# Patient Record
Sex: FEMALE | Race: WHITE | NOT HISPANIC OR LATINO | URBAN - METROPOLITAN AREA
[De-identification: names, ages, dates, MRNs, and addresses within clinical notes are randomized per-mention and may not be internally consistent; named-entity substitution may affect disease eponyms.]

---

## 2023-07-26 ENCOUNTER — APPOINTMENT (RX ONLY)
Dept: URBAN - METROPOLITAN AREA CLINIC 45 | Facility: CLINIC | Age: 48
Setting detail: DERMATOLOGY
End: 2023-07-26

## 2023-07-26 ENCOUNTER — APPOINTMENT (RX ONLY)
Dept: URBAN - METROPOLITAN AREA CLINIC 44 | Facility: CLINIC | Age: 48
Setting detail: DERMATOLOGY
End: 2023-07-26

## 2023-07-26 DIAGNOSIS — Z41.9 ENCOUNTER FOR PROCEDURE FOR PURPOSES OTHER THAN REMEDYING HEALTH STATE, UNSPECIFIED: ICD-10-CM

## 2023-07-26 DIAGNOSIS — L81.4 OTHER MELANIN HYPERPIGMENTATION: ICD-10-CM

## 2023-07-26 DIAGNOSIS — L82.1 OTHER SEBORRHEIC KERATOSIS: ICD-10-CM

## 2023-07-26 DIAGNOSIS — D18.0 HEMANGIOMA: ICD-10-CM

## 2023-07-26 DIAGNOSIS — L73.8 OTHER SPECIFIED FOLLICULAR DISORDERS: ICD-10-CM

## 2023-07-26 DIAGNOSIS — L90.8 OTHER ATROPHIC DISORDERS OF SKIN: ICD-10-CM

## 2023-07-26 DIAGNOSIS — D22 MELANOCYTIC NEVI: ICD-10-CM

## 2023-07-26 PROBLEM — D18.01 HEMANGIOMA OF SKIN AND SUBCUTANEOUS TISSUE: Status: ACTIVE | Noted: 2023-07-26

## 2023-07-26 PROBLEM — D22.5 MELANOCYTIC NEVI OF TRUNK: Status: ACTIVE | Noted: 2023-07-26

## 2023-07-26 PROCEDURE — ? TREATMENT REGIMEN

## 2023-07-26 PROCEDURE — 99203 OFFICE O/P NEW LOW 30 MIN: CPT

## 2023-07-26 PROCEDURE — ? ADDITIONAL NOTES

## 2023-07-26 PROCEDURE — ? PRODUCT LINE (REVISION)

## 2023-07-26 PROCEDURE — ? COUNSELING

## 2023-07-26 PROCEDURE — ? PRESCRIPTION

## 2023-07-26 PROCEDURE — ? COSMETIC QUOTE

## 2023-07-26 PROCEDURE — ? FULL BODY SKIN EXAM

## 2023-07-26 RX ORDER — TRETIONIN 1 MG/G
CREAM TOPICAL
Qty: 45 | Refills: 5 | Status: ERX | COMMUNITY
Start: 2023-07-26

## 2023-07-26 RX ADMIN — TRETIONIN: 1 CREAM TOPICAL at 00:00

## 2023-07-26 ASSESSMENT — LOCATION SIMPLE DESCRIPTION DERM
LOCATION SIMPLE: LEFT UPPER BACK
LOCATION SIMPLE: CHEST
LOCATION SIMPLE: RIGHT CHEEK
LOCATION SIMPLE: RIGHT CHEEK
LOCATION SIMPLE: LEFT CHEEK
LOCATION SIMPLE: LEFT CHEEK
LOCATION SIMPLE: ABDOMEN
LOCATION SIMPLE: CHEST

## 2023-07-26 ASSESSMENT — LOCATION ZONE DERM
LOCATION ZONE: TRUNK
LOCATION ZONE: FACE
LOCATION ZONE: FACE
LOCATION ZONE: TRUNK

## 2023-07-26 ASSESSMENT — LOCATION DETAILED DESCRIPTION DERM
LOCATION DETAILED: RIGHT CENTRAL MALAR CHEEK
LOCATION DETAILED: LEFT MID-UPPER BACK
LOCATION DETAILED: EPIGASTRIC SKIN
LOCATION DETAILED: RIGHT INFERIOR CENTRAL MALAR CHEEK
LOCATION DETAILED: RIGHT LATERAL SUPERIOR CHEST
LOCATION DETAILED: LEFT INFERIOR MEDIAL MALAR CHEEK
LOCATION DETAILED: RIGHT LATERAL ABDOMEN
LOCATION DETAILED: LEFT INFERIOR CENTRAL MALAR CHEEK
LOCATION DETAILED: LEFT LATERAL SUPERIOR CHEST

## 2023-07-26 NOTE — HPI: EVALUATION OF SKIN LESION(S)
What Type Of Note Output Would You Prefer (Optional)?: Bullet Format
Hpi Title: Evaluation of Skin Lesions
How Severe Are Your Spot(S)?: mild
Have Your Spot(S) Been Treated In The Past?: has not been treated
Additional History: New pt\\n\\nUsed the tretinoin 0.5% but wants to go up  (anti aging )

## 2023-07-26 NOTE — PROCEDURE: PRODUCT LINE (REVISION)
Allow Plan To Count Towards E/M Coding: Yes
Product 14 Units: 0
Product 32 Price (In Dollars - Numeric Only, No Special Characters Or $): 107.00
Product 35 Price (In Dollars - Numeric Only, No Special Characters Or $): 133.00
Name Of Product 21: Revox 7
Name Of Product 41: Proscriptix Advanced Corrective Cream
Product 29 Application Directions: Apply at night for a softening/moisturizing treatment on lips
Name Of Product 38: Obagi Makeup Wipes
Product 2 Price (In Dollars - Numeric Only, No Special Characters Or $): 84.00
Product 17 Price (In Dollars - Numeric Only, No Special Characters Or $): 160.00
Product 5 Price (In Dollars - Numeric Only, No Special Characters Or $): 62.00
Name Of Product 50: UpNeeq
Name Of Product 47: Antioxident Infused gentle Cleanser
Name Of Product 12: ProPil Vitamins
Product 14 Application Directions: Eyelash serum, apply first tube daily and apply second tube every 3 days to maintain
Product 38 Price (In Dollars - Numeric Only, No Special Characters Or $): 25.00
Name Of Product 44: HQ 2%
Name Of Product 24: Tretinoin
Product 8 Price (In Dollars - Numeric Only, No Special Characters Or $): 98.00
Product 24 Price (In Dollars - Numeric Only, No Special Characters Or $): 60.00
Product 44 Price (In Dollars - Numeric Only, No Special Characters Or $): 18.00
Product 2 Application Directions: Apply last in regimen daily in the morning
Product 17 Application Directions: Apply daily in the morning after serums before sunscreen
Name Of Product 30: DEJ Face Night cream
Name Of Product 27: Intellishade Matte
Product 41 Price (In Dollars - Numeric Only, No Special Characters Or $): 110.00
Product 21 Price (In Dollars - Numeric Only, No Special Characters Or $): 130.00
Product 8 Application Directions: Use daily in the morning for Hydration on face, neck, chest and hands.
Product 27 Price (In Dollars - Numeric Only, No Special Characters Or $): 80.00
Product 30 Price (In Dollars - Numeric Only, No Special Characters Or $): 178.00
Name Of Product 15: Brightening facial Wash
Product 47 Price (In Dollars - Numeric Only, No Special Characters Or $): 46.00
Product 32 Application Directions: Apply every morning before lotions.
Product 5 Application Directions: Apply every morning after Vitamin C, before moisturizer or sunscreen
Product 35 Application Directions: apply under eye every night
Product 50 Price (In Dollars - Numeric Only, No Special Characters Or $): $8
Name Of Product 6: Revox line relaxer
Product 15 Price (In Dollars - Numeric Only, No Special Characters Or $): 38.00
Name Of Product 33: Crispin
Product 21 Application Directions: Apply daily in the morning, especially on picture days
Name Of Product 36: Finishing Touch
Name Of Product 3: Obagi Vitamin C with 4% hydro-q
Name Of Product 18: Lumiquin
Product 18 Price (In Dollars - Numeric Only, No Special Characters Or $): 64.00
Product 6 Price (In Dollars - Numeric Only, No Special Characters Or $): 154.00
Name Of Product 39: Hair Bundle including Shampoo/conditioner/vitamins/serum
Product 33 Price (In Dollars - Numeric Only, No Special Characters Or $): 158.00
Product 3 Price (In Dollars - Numeric Only, No Special Characters Or $): 139.00
Product 24 Application Directions: Apply a small amount to face nighly, can start with 3 night per week and work up to every night. Avoid eyes.
Product 12 Application Directions: Take suppliment daily
Name Of Product 45: Color Science Sunforgettable Brush
Product 27 Application Directions: Apply every morning as the last step, before makeup. This is your sunscreen
Name Of Product 42: Obagi Acne Cleansing Wipes
Product 36 Price (In Dollars - Numeric Only, No Special Characters Or $): 50.00
Name Of Product 22: Firming Night Treatment
Name Of Product 28: Gentle Foaming Cleanser
Product 6 Units: 1
Product 22 Price (In Dollars - Numeric Only, No Special Characters Or $): 78.00
Name Of Product 13: Serum
Name Of Product 48: Obaig Exfoliating Cleanser
Product 15 Application Directions: Cleanse face every morning and night. If it starts to dry the skin switch to every other day use.
Name Of Product 25: Intellishade Clear
Name Of Product 31: REssentials Blemish rescue Serum
Name Of Product 16: DEJ Eye Cream
Product 18 Application Directions: Apply daily to hands with a pea size for both, can apply twice per day if desired
Product 3 Application Directions: Use daily in the morning (swap with Revision Vitamin C once desired results are achieved)
Product 10 Price (In Dollars - Numeric Only, No Special Characters Or $): 103.00
Product 33 Application Directions: Apply once per week, rinse thoroughly.
Product 48 Price (In Dollars - Numeric Only, No Special Characters Or $): 34.00
Product 6 Application Directions: Apply every night after cleansing, including eye lids.
Name Of Product 19: Nectifirm
Product 13 Price (In Dollars - Numeric Only, No Special Characters Or $): 90.00
Name Of Product 4: Mehran
Name Of Product 34: Pore Celso Mask
Name Of Product 1: C+ Correcting complex 30% vitamin C
Product 31 Price (In Dollars - Numeric Only, No Special Characters Or $): 51.00
Product 28 Price (In Dollars - Numeric Only, No Special Characters Or $): 44.00
Product 9 Application Directions: Use daily at night after cleansing (Vitamin A)
Product 36 Application Directions: Apply to dry skin once per week, add a small amount of water in a circular motion across entire face to exfoliate
Product 45 Application Directions: Medium
Product 10 Application Directions: Use nightly unitl gone as a spot treatment
Name Of Product 37: Super Charged C Serum
Name Of Product 10: Obagi  4% Hydro-Q
Detail Level: Zone
Product 16 Price (In Dollars - Numeric Only, No Special Characters Or $): 118.00
Product 1 Price (In Dollars - Numeric Only, No Special Characters Or $): 176.00
Product 22 Application Directions: Apply every night
Name Of Product 7: Papaya Enz Cleanser
Name Of Product 43: Brightening Pads
Name Of Product 20: Nectifirm Advanced
Product 7 Price (In Dollars - Numeric Only, No Special Characters Or $): 40.00
Name Of Product 23: DEJ Boosting Serum
Product 28 Application Directions: Use morning and night to cleanse face
Product 25 Application Directions: Apply a pea size of product to your face every morning as your last step before makeup.
Product 4 Price (In Dollars - Numeric Only, No Special Characters Or $): 106.00
Name Of Product 40: Intellishade Original
Product 31 Application Directions: Apply all over or as a spot treatment after cleansing. Use once daily or as needed for acne or folliculitis
Name Of Product 26: REssentials Brightening Pads
Product 16 Application Directions: Apply morning and night all over eyes
Name Of Product 29: Youthful Lip Complex
Name Of Product 11: Voumizing / Hydrating Shampoo and Conditioner
Name Of Product 49: Obagi Elastiderm Eye
Name Of Product 46: Retinol Complete .05
Name Of Product 14: Nu-Cil
Product 34 Application Directions: Apply a dime size to entire face 3 to 4 times per week
Product 11 Price (In Dollars - Numeric Only, No Special Characters Or $): 56.00
Product 26 Price (In Dollars - Numeric Only, No Special Characters Or $): 87.00
Name Of Product 32: HA Elixer
Product 4 Application Directions: Apply daily in the morning under eyes for puffiness and dark circles
Product 23 Price (In Dollars - Numeric Only, No Special Characters Or $): 225.00
Product 1 Application Directions: Apply daily in the morning
Product 37 Application Directions: Apply every morning before lotions
Name Of Product 2: Jim Saucedauphysical
Name Of Product 17: DEJ Face Cream
Assigning Risk Information: Per AMA, level of risk is based upon consequences of the problem(s) addressed at the encounter when appropriately treated. Risk also includes medical decision making related to the need to initiate or forego further testing, treatment and/or hospitalization. Over the counter medication are assigned a risk level of low. Prescription medication management is assigned a risk level of moderate.
Product 49 Price (In Dollars - Numeric Only, No Special Characters Or $): 115.00
Product 7 Application Directions: Wash face morning and night
Name Of Product 8: Obagi Hydro Drops
Product 43 Application Directions: No HQ
Product 23 Application Directions: Apply daily morning and night, or morning only if using DEJ night face cream
Product 40 Application Directions: This is your anti-aging, tinted moisturizer for day-time use. Contains SPF of 45
Product 20 Application Directions: Apply all over neck morning and/or night\\nUnder eye fat pads
Name Of Product 35: Obagi Retivance
Name Of Product 5: Vitamin k
Risk Of Complication Category: No MDM
Product 14 Price (In Dollars - Numeric Only, No Special Characters Or $): 120.00

## 2023-07-26 NOTE — PROCEDURE: ADDITIONAL NOTES
Additional Notes: Discuss Quote cosmetic $550 / treatment- v beam @ [facility] Additional Notes: Discuss Quote cosmetic $550 / treatment- v beam @ [facility]

## 2023-07-26 NOTE — PROCEDURE: COSMETIC QUOTE
Injectable 16 Free Text Discount (In Dollars- Use Only Numbers And Decimals): 0.00
Injectable 6 Units: 0
Injectable 3: Botox (forehead)
Body Procedure 9: Scalp PRP
Body Procedure 2 Price/Unit (In Dollars- Use Only Numbers And Decimals): 800.00
Face Procedure 1 Price/Unit (In Dollars- Use Only Numbers And Decimals): 429.00
Laser 7: Laser Hair Removal Beard Outline
Face Procedure 8: Hydrafacial Deluxe
Injectable 10 Price/Unit (In Dollars- Use Only Numbers And Decimals): 700.00
Laser 20: Laser Hair Removal Buttocks
Body Procedure 6: CoolPeel
Laser 14 Price/Unit (In Dollars- Use Only Numbers And Decimals): 299.00
Laser 4: Laser Hair Removal 1/2 Leg
Face Procedure 5: Viva Add on Neck
Body Procedure 3: Viva abdominal Scar
Laser 17: Laser Hair Removal Abdomen
Laser 11 Price/Unit (In Dollars- Use Only Numbers And Decimals): 99.00
Body Procedure 10 Price/Unit (In Dollars- Use Only Numbers And Decimals): 379.00
Breast Procedure 2 Price/Unit (In Dollars- Use Only Numbers And Decimals): 450.00
Face Procedure 5 Units: 5
Face Procedure 2: coolpeel laser full face
Injectable  11: Volux
Body Procedure 7 Price/Unit (In Dollars- Use Only Numbers And Decimals): 1400.00
Laser 8 Price/Unit (In Dollars- Use Only Numbers And Decimals): 900.00
Laser 15: Laser Hair Removal Full Arms
Face Procedure 5 Percentage Discount: 10
Misc Procedure 1 Price/Unit (In Dollars- Use Only Numbers And Decimals): 75.00
Body Procedure 4: Add on extractions
Laser 15 Price/Unit (In Dollars- Use Only Numbers And Decimals): 499.00
Face Procedure 6: V-Beam Laser
Laser 5: Laser Hair Removal Brazilian
Breast Procedure 3 Hayden/Unit (In Dollars- Use Only Numbers And Decimals): 789.00
Include Signature: No
Face Procedure 3: Viva Full Face
Laser 2: Laser Hair Removal Full Body
Laser 18: Laser Hair Removal 1/2 Back
Laser 12 Price/Unit (In Dollars- Use Only Numbers And Decimals): 199.00
Laser 1 Price/Unit (In Dollars- Use Only Numbers And Decimals): 200.00
Laser 16: Laser Hair Removal Chest
Number Of Months: 1
Injectable 9: Kybella
Laser 9 Price/Unit (In Dollars- Use Only Numbers And Decimals): 200-300
Body Procedure 8 Price/Unit (In Dollars- Use Only Numbers And Decimals): 1200
Laser 13: Laser Hair Removal Under Arms
Injectable 2 Hayden/Unit (In Dollars- Use Only Numbers And Decimals): 50.00
Injectable 6: Juvederm Ultra XC (lips)
Body Procedure 5 Price/Unit (In Dollars- Use Only Numbers And Decimals): 350.00
Breast Procedure 1: Arms and Hands CoolPeel
Face Procedure 4 Price/Unit (In Dollars- Use Only Numbers And Decimals): 250.00
Laser 3 Price/Unit (In Dollars- Use Only Numbers And Decimals): 675.00
Laser 10: Laser hair Removal [place]
Laser 19 Price/Unit (In Dollars- Use Only Numbers And Decimals): 500.00
Breast Procedure 1 Price/Unit (In Dollars- Use Only Numbers And Decimals): 1100.00
Face Procedure 1: VI Precision plus peel
Injectable 10: Tear Trough Filler
Injectable 3 Hayden/Unit (In Dollars- Use Only Numbers And Decimals): 15.00
Laser 14: Laser Hair Removal 1/2 Arms
Include Sales Tax On Surgeon's Fees: Yes
Detail Level: Zone
Injectable 7: Lip filler
Laser 4 Price/Unit (In Dollars- Use Only Numbers And Decimals): 475.00
Body Procedure 3 Price/Unit (In Dollars- Use Only Numbers And Decimals): 550.00
Body Procedure 10: SkinPen Microneedling
Breast Procedure 2: CoolPeel spot treatment
Laser 11: Laser Hair Removal Lip/Chin
Misc Procedure 1: Add-on Milia Extractions
Injectable 4: Facial Filler
Body Procedure 7: Terri Yu
Face Procedure 9: IPL Treatment
Laser 8: CoolPeel Full face
Injectable 1: Botox
Laser 12: Laser hair Removal Full Face/ Beard
Breast Procedure 3: Microneedling with PRP
Injectable 8: Lyft
Laser 5 Price/Unit (In Dollars- Use Only Numbers And Decimals): 399.00
Face Procedure 6 Price/Unit (In Dollars- Use Only Numbers And Decimals): 300.450
Laser 1: Add on IPL
Laser 9: Cherry Angeomas
Face Procedure 10: SkinPen micronedling
Injectable 5: Voluma
Injectable 2: Botox lip flip
Laser 2 Price/Unit (In Dollars- Use Only Numbers And Decimals): 1000.00
Body Procedure 8: Coolpeel lower legs
Laser 6: Laser Hair Removal Bikini
Face Procedure 7: IPL
Laser 19: Laser Hair Removal Full Back
Body Procedure 5: Viva Around mouth only
Body Procedure 1: Keravive
Face Procedure 4: Add-On IPL laser
Laser 3: Laser Hair Removal Full Legs
Body Procedure 2: Viva with IPL

## 2024-02-07 NOTE — PROCEDURE: ADDITIONAL NOTES
----- Message from SHIREEN Lorenzo sent at 2/7/2024  9:33 AM PST -----  Pt did labs at [facility] one month ago (Johnie) and these are not in the computer. Thanks for grabbing!     Render Risk Assessment In Note?: no